# Patient Record
(demographics unavailable — no encounter records)

---

## 2024-11-17 NOTE — REASON FOR VISIT
[Follow - Up] : a follow-up visit [PCOS] : PCOS [Home] : at home, [unfilled] , at the time of the visit. [Medical Office: ([facility])___] : at the medical office located in  [Patient] : the patient

## 2024-11-17 NOTE — HISTORY OF PRESENT ILLNESS
[FreeTextEntry1] : TEB start time 452pm  end time 518pm  follow up secodnary amenorhea  stilll without menses   was on wegovy for 2 week- no helpw tih weight  could not get mounjaro or  zepbound    PMH  dx with PCOS with obgyn  now age 21 Pt LMP 3 years ago  tried OCP -- did not get periods Tried MFN  developed GI issues but alos did not work   tried medroxyprogesterone but did nnot work - tried 3 x   first time  was ok   now withdrawal bleeding on 2nd and 3rd times  GYn suggested ozmepic  -pt referred here  In addiotn, pt has been trying Inositol CoQ10 Mag Zinc  without success  Also pt has been having some acne andhirstusim  also  has been using spearmit tea since last year   however pt has been on compounded semaglutide  - paying   $300 per month    GYn  puberty  age 12    PMH  HTn recently diagnosed  but changed diet-- limited sodium -no meds yet  ASthma No DM or PreDM  no pancreatitis / Thyroid issues  no gallstones   PSH  Umbilical hernia repair  Meds Semaglutide   All NKDA   FH   Grandmohter- DM Mom healthy  DAd Healthy  uncle stomach CA  no sibling  no chiildrren no thyroid cancers in faily no MTC   Soc HX   [place]   [place] since age 8 No Chem No XRT  nonsmoker  no alcohol use    insurance willnot cover

## 2024-11-17 NOTE — REVIEW OF SYSTEMS
[As Noted in HPI] : as noted in HPI [Acne] : acne [Hirsutism] : hirsutism [Negative] : Heme/Lymph [All other systems negative] : All other systems negative

## 2024-11-17 NOTE — ASSESSMENT
[FreeTextEntry1] : Secondary amenorrhea  pt with w/o inpast Cw PCOS  inc AMH   elevated teststoerone level  pt with amenorrhea x 3 year  as unable to get GLP1RA  will have ot retry MFN   mg qd  then inc to 2 tab   dw pt soemtimes needs a few months to get working    can see if insurance will cover Zepbound for PCOS   pt will stop spearmint tea whichcan apparenlty lower testsoterone level  but may cause cycles to become abnormal in length   will hold this and then repeat hormonal levles  ? [facility]   17 OHP in past wnl   check CT scan adrenal  check US  thryoid    follwo up GYn for another pelvic sono  Insomnia  s ee PMD